# Patient Record
Sex: MALE | Race: WHITE
[De-identification: names, ages, dates, MRNs, and addresses within clinical notes are randomized per-mention and may not be internally consistent; named-entity substitution may affect disease eponyms.]

---

## 2018-05-16 ENCOUNTER — HOSPITAL ENCOUNTER (EMERGENCY)
Dept: HOSPITAL 97 - ER | Age: 73
Discharge: HOME | End: 2018-05-16
Payer: COMMERCIAL

## 2018-05-16 DIAGNOSIS — G20: ICD-10-CM

## 2018-05-16 DIAGNOSIS — R55: Primary | ICD-10-CM

## 2018-05-16 LAB
BUN BLD-MCNC: 16 MG/DL (ref 6–20)
GLUCOSE SERPLBLD-MCNC: 106 MG/DL (ref 65–120)
HCT VFR BLD CALC: 38.2 % (ref 39.6–49)
LYMPHOCYTES # SPEC AUTO: 2 K/UL (ref 0.7–4.9)
MCH RBC QN AUTO: 31.2 PG (ref 27–35)
MCV RBC: 92.8 FL (ref 80–100)
PMV BLD: 7.8 FL (ref 7.6–11.3)
POTASSIUM SERPL-SCNC: 3.9 MEQ/L (ref 3.6–5)
RBC # BLD: 4.11 M/UL (ref 4.33–5.43)

## 2018-05-16 PROCEDURE — 85025 COMPLETE CBC W/AUTO DIFF WBC: CPT

## 2018-05-16 PROCEDURE — 93005 ELECTROCARDIOGRAM TRACING: CPT

## 2018-05-16 PROCEDURE — 36415 COLL VENOUS BLD VENIPUNCTURE: CPT

## 2018-05-16 PROCEDURE — 80048 BASIC METABOLIC PNL TOTAL CA: CPT

## 2018-05-16 PROCEDURE — 96360 HYDRATION IV INFUSION INIT: CPT

## 2018-05-16 PROCEDURE — 70450 CT HEAD/BRAIN W/O DYE: CPT

## 2018-05-16 PROCEDURE — 72125 CT NECK SPINE W/O DYE: CPT

## 2018-05-16 PROCEDURE — 99284 EMERGENCY DEPT VISIT MOD MDM: CPT

## 2018-05-16 NOTE — EKG
Test Date:    2018-05-16               Test Time:    01:36:23

Technician:   MICKI                                    

                                                     

MEASUREMENT RESULTS:                                       

Intervals:                                           

Rate:         61                                     

CA:           142                                    

QRSD:         76                                     

QT:           418                                    

QTc:          420                                    

Axis:                                                

P:            59                                     

CA:           142                                    

QRS:          5                                      

T:            33                                     

                                                     

INTERPRETIVE STATEMENTS:                                       

                                                     

Normal sinus rhythm

Normal ECG

Compared to ECG 11/21/2017 11:47:17

No significant changes



Electronically Signed On 05-16-18 08:46:43 CDT by Jeff Urbina

## 2018-05-16 NOTE — RAD REPORT
EXAM DESCRIPTION:  CT - [id number] - 5/16/2018 2:05 am

 

CLINICAL HISTORY:  Fall, head and neck injury, headache, neck pain

 

 A preliminary written report was provided at the time of the study, and the report was reviewed prio
r to final dictation.

 

COMPARISON:  None.

 

TECHNIQUE:  Axial 5 mm thick images of the head were obtained.  Axial 2 mm thick images of the cervic
al spine were obtained with sagittal and coronal reconstruction images generated and reviewed.

 

All CT scans are performed using dose optimization technique as appropriate and may include automated
 exposure control or mA/KV adjustment according to patient size.

 

FINDINGS:

No intracranial hemorrhage, mass, edema or acute intracranial finding. No acute cortical based infarc
tion. Mild atrophy and chronic ischemic changes are present. Bilateral deep neurostimulator wires are
 in place through frontal lobe approach. Arterial and physiologic calcifications are present. Ventric
les are in proportion to volume loss. No extra-axial fluid collections. Mastoid air cells and paranas
al sinuses are clear. No globe or orbit abnormality seen.

 

Cervical body height and alignment are normal. Minimal disc space narrowing in the cervical spine. Po
sterior endplate spurring and uncovertebral hypertrophy cause bilateral foraminal stenosis at C3-4 an
d on the left at C4-5. Facet degenerative changes contribute as well. Similar process causes left for
aminal stenosis at C5-6. Canal is borderline stenotic at this level. - No fracture or acute bony abno
rmality. No pathologic bone process.

 

No paraspinal mass or hematoma.

 

IMPRESSION:  No hemorrhage, edema or acute intracranial finding.

 

Prominent cervical spine degenerative change as detailed. No fracture or acute finding.

## 2018-05-16 NOTE — EDPHYS
Physician Documentation                                                                           

 [facility]                                                                

Name: Rishi Koroma Jr                                                                              

Age: 73 yrs                                                                                       

Sex: Male                                                                                         

: 1945                                                                                   

MRN: [id number]                                                                                   

Arrival Date: 2018                                                                          

Time: 00:14                                                                                       

Account#: [id number]                                                                            

Bed 18                                                                                            

Private MD:                                                                                       

ED Physician Dariusz Rivera                                                                       

HPI:                                                                                              

                                                                                             

02:29 This 73 yrs old  Male presents to ER via EMS with unknown complaint.           gs  

02:29 The patient has experienced syncope, became unresponsive. Onset: The symptoms/episode   gs  

      began/occurred acutely, just prior to arrival. Duration: This was a single episode.         

      Context: the episode(s) was witnessed, by family, occurred at home, occurred while the      

      patient was standing, walking. Associated injury: Head/face: left side of the back of       

      head. Associated signs and symptoms: Pertinent negatives: agitation, ataxia. Current        

      symptoms: Currently, the patient is not experiencing any symptoms, the patient feels        

      back to baseline. The patient has experienced a previous episode.                           

                                                                                                  

Historical:                                                                                       

- Allergies:                                                                                      

00:20 NKDA;                                                                                   ak1 

- Home Meds:                                                                                      

00:20 carbidopa-levodopa  mg oral TbDL [Active]; Lumigan ophthalmic ophthalmic          ak1 

      [Active]; Combigan 0.2-0.5 % ophthalmic drop 1 drop every 12 hours [Active]; tamsulosin     

      0.4 mg oral cp24 1 cap once daily [Active]; Xanax 0.5 mg Oral tab 1.5 tab nightly           

      [Active]; ropinirole 3 mg oral tab 2 tabs [Active];                                         

- PMHx:                                                                                           

00:20 Parkinsons; Cataracts; Glaucoma;                                                        ak1 

- PSHx:                                                                                           

00:20 rotator cuff; deep brain stimulator;                                                    ak1 

                                                                                                  

- Immunization history:: Adult Immunizations unknown.                                             

- Social history:: Smoking status: Patient/guardian denies using tobacco.                         

                                                                                                  

                                                                                                  

ROS:                                                                                              

02:29 All other systems are negative.                                                         gs  

                                                                                                  

Exam:                                                                                             

02:29 Eyes:  Pupils equal round and reactive to light, extra-ocular motions intact.  Lids and gs  

      lashes normal.  Conjunctiva and sclera are non-icteric and not injected.  Cornea within     

      normal limits.  Periorbital areas with no swelling, redness, or edema. ENT:  Nares          

      patent. No nasal discharge, no septal abnormalities noted.  Tympanic membranes are          

      normal and external auditory canals are clear.  Oropharynx with no redness, swelling,       

      or masses, exudates, or evidence of obstruction, uvula midline.  Mucous membranes           

      moist. Neck:  Trachea midline, no thyromegaly or masses palpated, and no cervical           

      lymphadenopathy.  Supple, full range of motion without nuchal rigidity, or vertebral        

      point tenderness.  No Meningismus. Chest/axilla:  Normal chest wall appearance and          

      motion.  Nontender with no deformity.  No lesions are appreciated. Cardiovascular:          

      Regular rate and rhythm with a normal S1 and S2.  No gallops, murmurs, or rubs.  Normal     

      PMI, no JVD.  No pulse deficits. Respiratory:  Lungs have equal breath sounds               

      bilaterally, clear to auscultation and percussion.  No rales, rhonchi or wheezes noted.     

       No increased work of breathing, no retractions or nasal flaring. Abdomen/GI:  Soft,        

      non-tender, with normal bowel sounds.  No distension or tympany.  No guarding or            

      rebound.  No evidence of tenderness throughout. Back:  No spinal tenderness.  No            

      costovertebral tenderness.  Full range of motion. Skin:  Warm, dry with normal turgor.      

      Normal color with no rashes, no lesions, and no evidence of cellulitis. MS/ Extremity:      

      Pulses equal, no cyanosis.  Neurovascular intact.  Full, normal range of motion.            

02:29 Constitutional: The patient appears alert, awake.                                           

02:29 Head/face: Noted is abrasion(s), that are mild.                                             

02:29 Musculoskeletal/extremity: Extremities: all appear grossly normal, with no appreciated      

      pain with palpation.                                                                        

02:29 Neuro: Orientation: to person, place \T\ time. Cranial nerves: CN II- XII are normal as     

      tested, Motor: moves all fours, Sensation: no obvious gross deficits.                       

02:29 ECG was reviewed by the Attending Physician.                                              

                                                                                                  

Vital Signs:                                                                                      

00:20  / 64; Pulse 63; Resp 16; Temp 97.4(O); Pulse Ox 99% on R/A; Weight 68.04 kg (R); ak1 

      Height 5 ft. 4 in. (162.56 cm) (R); Pain 0/10;                                              

01:02 BP 97 / 66; Pulse 62; Resp 16; Pulse Ox 100% on R/A; Pain 0/10;                         ak1 

01:54  / 67; Pulse 64; Resp 16; Temp 97.9; Pulse Ox 100% on R/A; Pain 0/10;             ak1 

00:20 Body Mass Index 25.75 (68.04 kg, 162.56 cm)                                             ak1 

                                                                                                  

MDM:                                                                                              

00:45 Patient medically screened.                                                               

02:29 Differential Diagnosis: cardiac arrhythmia, idiopathic syncope, vasovagal episode. Data gs  

      reviewed: vital signs, nurses notes. Response to treatment: the patient's symptoms have     

      markedly improved after treatment, the patient's condition has returned to base line,       

      and as a result, I will discharge patient.                                                  

                                                                                                  

                                                                                             

01:20 Order name: CBC with Diff; Complete Time: 02:06                                           

                                                                                             

01:20 Order name: Basic Metabolic Panel; Complete Time: 02:06                                   

                                                                                             

00:25 Order name: CT Head C Spine                                                             bb  

                                                                                             

01:20 Order name: EKG; Complete Time: 01:21                                                     

                                                                                             

01:20 Order name: EKG - Nurse/Tech; Complete Time: 01:48                                        

                                                                                                  

EC:29 Rate is 61 beats/min. Rhythm is regular. QRS interval is normal. T waves are Normal. No gs  

      ST changes noted. Clinical impression: Normal ECG. Interpreted by me.                       

                                                                                                  

Administered Medications:                                                                         

01:48 Drug: NS 0.9% 1000 ml Route: IV; Rate: 1 bolus; Site: left forearm;                     ak1 

02:30 Follow up: IV Status: Completed infusion                                                ak1 

                                                                                                  

                                                                                                  

Disposition:                                                                                      

18 02:35 Discharged to Home. Impression: Syncope and collapse.                              

- Condition is Stable.                                                                            

- Discharge Instructions: Near-Syncope, Syncope.                                                  

                                                                                                  

- Medication Reconciliation Form, Thank You Letter, Antibiotic Education, Prescription            

  Opioid Use form.                                                                                

- Follow up: Private Physician; When: 2 - 3 days; Reason: Re-evaluation by your                   

  physician.                                                                                      

                                                                                                  

                                                                                                  

                                                                                                  

Signatures:                                                                                       

Dispatcher [employer]                           Sabrina Garcia RN                       RN   ak1                                                  

Dariusz Rivera MD MD                                                      

                                                                                                  

Corrections: (The following items were deleted from the chart)                                    

02:49 02:35 2018 02:35 Discharged to Home. Impression: Syncope and collapse. Condition  ak1 

      is Stable. Forms are Medication Reconciliation Form, Thank You Letter, Antibiotic           

      Education, Prescription Opioid Use. Follow up: Private Physician; When: 2 - 3 days;         

      Reason: Re-evaluation by your physician. gs                                                 

                                                                                                  

**************************************************************************************************

## 2018-05-16 NOTE — ER
Nurse's Notes                                                                                     

 [facility]                                                                

Name: Rishi Koroma Jr                                                                              

Age: 73 yrs                                                                                       

Sex: Male                                                                                         

: 1945                                                                                   

MRN: [id number]                                                                                   

Arrival Date: 2018                                                                          

Time: 00:14                                                                                       

Account#: [id number]                                                                            

Bed 18                                                                                            

Private MD:                                                                                       

Diagnosis: Syncope and collapse                                                                   

                                                                                                  

Presentation:                                                                                     

                                                                                             

00:14 Presenting complaint: EMS states: pt was found by daughter in restroom with positive    ak1 

      LOC s/p fall. pt alert and awake in ER 18 answering questions appropriately. pt with        

      slurred speech which daughter stated is normal for pt. pt daughter stated pt hit his        

      head and had an unsteady gait. EMS reported [facility]SB[facility] 106. Transition of care: patient was       

      not received from another setting of care. Onset of symptoms was May 16, 2018. Initial      

      Sepsis Screen: Does the patient meet any 2 criteria? No. Patient's initial sepsis           

      screen is negative. Does the patient have a suspected source of infection? No.              

      Patient's initial sepsis screen is negative. Care prior to arrival: None.                   

00:14 Method Of Arrival: EMS: [facility]                                                ak1 

00:14 Acuity: JOY 3                                                                           ak1 

                                                                                                  

Triage Assessment:                                                                                

00:20 General: Appears in no apparent distress. Behavior is calm, cooperative. Pain: Denies   ak1 

      pain. EENT: No signs and/or symptoms were reported regarding the EENT system. Neuro:        

      Level of Consciousness is awake, alert, obeys commands, Oriented to person, place,          

      time, situation,  are equal bilaterally Moves all extremities. Speech is slurred,      

      pt speech is slurred, WNL for pt per daughter at bedside. Facial symmetry appears           

      normal. Cardiovascular: No deficits noted. Respiratory: No deficits noted. GI: No signs     

      and/or symptoms were reported involving the gastrointestinal system. : No signs           

      and/or symptoms were reported regarding the genitourinary system. Derm: No signs and/or     

      symptoms reported regarding the dermatologic system. Musculoskeletal: No signs and/or       

      symptoms reported regarding the musculoskeletal system.                                     

                                                                                                  

Historical:                                                                                       

- Allergies:                                                                                      

00:20 NKDA;                                                                                   ak1 

- Home Meds:                                                                                      

00:20 carbidopa-levodopa  mg oral TbDL [Active]; Lumigan ophthalmic ophthalmic          ak1 

      [Active]; Combigan 0.2-0.5 % ophthalmic drop 1 drop every 12 hours [Active]; tamsulosin     

      0.4 mg oral cp24 1 cap once daily [Active]; Xanax 0.5 mg Oral tab 1.5 tab nightly           

      [Active]; ropinirole 3 mg oral tab 2 tabs [Active];                                         

- PMHx:                                                                                           

00:20 Parkinsons; Cataracts; Glaucoma;                                                        ak1 

- PSHx:                                                                                           

00:20 rotator cuff; deep brain stimulator;                                                    ak1 

                                                                                                  

- Immunization history:: Adult Immunizations unknown.                                             

- Social history:: Smoking status: Patient/guardian denies using tobacco.                         

                                                                                                  

                                                                                                  

Screenin:21 Abuse screen: Denies threats or abuse. Denies injuries from another. Nutritional        ak1 

      screening: No deficits noted. Tuberculosis screening: No symptoms or risk factors           

      identified. Fall Risk Gait- Weak (10 pts.).                                                 

                                                                                                  

Assessment:                                                                                       

00:22 Reassessment: Patient appears in no apparent distress at this time. No changes from     ak1 

      previously documented assessment. Patient and/or family updated on plan of care and         

      expected duration. Pain level reassessed. Patient is alert, oriented x 3, equal             

      unlabored respirations, skin warm/dry/pink. see triage assessment.                          

01:02 Reassessment: Patient appears in no apparent distress at this time. Patient is alert,   ak1 

      oriented x 3, equal unlabored respirations, skin warm/dry/pink. pt returned from CT         

      scan. pt continues to crack jokes and smile. pt awake and alert.                            

02:48 Reassessment: Patient appears in no apparent distress at this time. No changes from     ak1 

      previously documented assessment. Patient and/or family updated on plan of care and         

      expected duration. Pain level reassessed. Patient is alert, oriented x 3, equal             

      unlabored respirations, skin warm/dry/pink. pt ambulated with little assistance to          

      wheelchair outside of the room. ERP notified of pt ability to ambulate.                     

                                                                                                  

Vital Signs:                                                                                      

00:20  / 64; Pulse 63; Resp 16; Temp 97.4(O); Pulse Ox 99% on R/A; Weight 68.04 kg (R); ak1 

      Height 5 ft. 4 in. (162.56 cm) (R); Pain 0/10;                                              

01:02 BP 97 / 66; Pulse 62; Resp 16; Pulse Ox 100% on R/A; Pain 0/10;                         ak1 

01:54  / 67; Pulse 64; Resp 16; Temp 97.9; Pulse Ox 100% on R/A; Pain 0/10;             ak1 

00:20 Body Mass Index 25.75 (68.04 kg, 162.56 cm)                                             ak1 

                                                                                                  

ED Course:                                                                                        

00:14 Patient arrived in ED.                                                                  ak1 

00:16 Triage completed.                                                                       ak1 

00:20 Arm band placed on Patient placed in an exam room, on a stretcher, on pulse oximetry,   ak1 

      Patient notified of wait time.                                                              

00:21 Patient has correct armband on for positive identification. Bed in low position. Call   ak1 

      light in reach. Side rails up X 1. Adult w/ patient. Pulse ox on. NIBP on.                  

00:24 Dariusz Rivera MD is Attending Physician.                                                

00:45 CT Head C Spine In Process Unspecified.                                                 EDMS

01:01 Sabrina Berrios RN is Primary Nurse.                                                     ak1 

01:48 Initial lab(s) drawn, by me, sent to lab. EKG done, by ED staff, reviewed by Dariusz Rivera MD. Inserted saline lock: 20 gauge in left forearm, using aseptic technique.          

      Blood collected.                                                                            

01:49 Assisted with urinal.                                                                   ak1 

01:54 No provider procedures requiring assistance completed.                                  ak1 

02:30 Assisted with urinal.                                                                   ak1 

02:48 IV discontinued, intact, bleeding controlled, No redness/swelling at site. Pressure     ak1 

      dressing applied.                                                                           

                                                                                                  

Administered Medications:                                                                         

01:48 Drug: NS 0.9% 1000 ml Route: IV; Rate: 1 bolus; Site: left forearm;                     ak1 

02:30 Follow up: IV Status: Completed infusion                                                ak1 

                                                                                                  

                                                                                                  

Outcome:                                                                                          

02:35 Discharge ordered by MD.                                                                  

02:47 Discharged to home via wheelchair, with family.                                         ak1 

02:47 Condition: good                                                                             

02:47 Discharge instructions given to patient, family, Instructed on discharge instructions,      

      follow up and referral plans. Demonstrated understanding of instructions, follow-up         

      care.                                                                                       

02:49 Patient left the ED.                                                                    ak1 

                                                                                                  

Signatures:                                                                                       

Dispatcher [employer]                           EDMS                                                 

aSbrina Berrios, BRAXTON                       RN   Dariusz Scott MD MD                                                      

                                                                                                  

**************************************************************************************************